# Patient Record
Sex: FEMALE | Race: WHITE | NOT HISPANIC OR LATINO | URBAN - METROPOLITAN AREA
[De-identification: names, ages, dates, MRNs, and addresses within clinical notes are randomized per-mention and may not be internally consistent; named-entity substitution may affect disease eponyms.]

---

## 2017-05-22 ENCOUNTER — IMPORTED ENCOUNTER (OUTPATIENT)
Dept: URBAN - METROPOLITAN AREA CLINIC 31 | Facility: CLINIC | Age: 47
End: 2017-05-22

## 2017-05-22 PROCEDURE — V2520 CONTACT LENS HYDROPHILIC: HCPCS

## 2017-05-22 PROCEDURE — 92014 COMPRE OPH EXAM EST PT 1/>: CPT

## 2017-05-22 PROCEDURE — 92310 CONTACT LENS FITTING OU: CPT

## 2017-05-22 PROCEDURE — 92015 DETERMINE REFRACTIVE STATE: CPT

## 2018-05-29 ENCOUNTER — IMPORTED ENCOUNTER (OUTPATIENT)
Dept: URBAN - METROPOLITAN AREA CLINIC 31 | Facility: CLINIC | Age: 48
End: 2018-05-29

## 2018-05-29 PROCEDURE — 92310 CONTACT LENS FITTING OU: CPT

## 2018-05-29 PROCEDURE — 92014 COMPRE OPH EXAM EST PT 1/>: CPT

## 2018-05-29 PROCEDURE — 92015 DETERMINE REFRACTIVE STATE: CPT

## 2018-05-29 PROCEDURE — V2520 CONTACT LENS HYDROPHILIC: HCPCS

## 2019-08-12 ENCOUNTER — IMPORTED ENCOUNTER (OUTPATIENT)
Dept: URBAN - METROPOLITAN AREA CLINIC 31 | Facility: CLINIC | Age: 49
End: 2019-08-12

## 2019-08-12 PROCEDURE — V2520 CONTACT LENS HYDROPHILIC: HCPCS

## 2019-08-12 PROCEDURE — 92014 COMPRE OPH EXAM EST PT 1/>: CPT

## 2019-08-12 PROCEDURE — 92015 DETERMINE REFRACTIVE STATE: CPT

## 2019-08-12 PROCEDURE — 92310 CONTACT LENS FITTING OU: CPT

## 2021-09-21 ENCOUNTER — IMPORTED ENCOUNTER (OUTPATIENT)
Dept: URBAN - METROPOLITAN AREA CLINIC 31 | Facility: CLINIC | Age: 51
End: 2021-09-21

## 2021-09-21 PROCEDURE — 92310 CONTACT LENS FITTING OU: CPT

## 2021-09-21 PROCEDURE — 92015 DETERMINE REFRACTIVE STATE: CPT

## 2021-09-21 PROCEDURE — 92014 COMPRE OPH EXAM EST PT 1/>: CPT

## 2021-09-21 NOTE — PATIENT DISCUSSION
Change glasses. Dispense trial contacts OU. To discontinue and call if any problems. Change to improve reading OS a quarter.

## 2022-04-02 ASSESSMENT — TONOMETRY
OD_IOP_MMHG: 12
OD_IOP_MMHG: 15
OS_IOP_MMHG: 12
OS_IOP_MMHG: 12
OS_IOP_MMHG: 14
OS_IOP_MMHG: 15
OD_IOP_MMHG: 14
OD_IOP_MMHG: 12

## 2022-04-02 ASSESSMENT — KERATOMETRY
OS_AXISANGLE_DEGREES: 008
OS_K2POWER_DIOPTERS: 44.75
OD_AXISANGLE2_DEGREES: 095
OS_K2POWER_DIOPTERS: 45.00
OD_AXISANGLE2_DEGREES: 089
OD_K2POWER_DIOPTERS: 45.00
OD_K1POWER_DIOPTERS: 43.75
OS_AXISANGLE2_DEGREES: 100
OS_K1POWER_DIOPTERS: 43.75
OS_AXISANGLE2_DEGREES: 098
OD_K2POWER_DIOPTERS: 44.00
OS_K1POWER_DIOPTERS: 43.50
OD_K1POWER_DIOPTERS: 43.50
OD_AXISANGLE_DEGREES: 179
OS_AXISANGLE_DEGREES: 010
OD_AXISANGLE_DEGREES: 005

## 2022-04-02 ASSESSMENT — VISUAL ACUITY
OD_SC: 20/40-2
OD_GLARE: 20/20
OD_SC: 20/20
OS_SC: 20/30
OS_CC: J1+
OS_CC: J216"
OS_CC: J7
OD_SC: 20/20-2
OD_SC: 20/25
OS_SC: 20/25
OD_CC: J1+16"
OS_CC: J1+-2
OD_SC: 20/20

## 2022-07-09 ENCOUNTER — TELEPHONE ENCOUNTER (OUTPATIENT)
Dept: URBAN - METROPOLITAN AREA CLINIC 121 | Facility: CLINIC | Age: 52
End: 2022-07-09

## 2022-07-10 ENCOUNTER — TELEPHONE ENCOUNTER (OUTPATIENT)
Dept: URBAN - METROPOLITAN AREA CLINIC 121 | Facility: CLINIC | Age: 52
End: 2022-07-10

## 2022-07-10 RX ORDER — DIPHENHYDRAMINE HCL 2 %
CREAM (GRAM) TOPICAL
Refills: 0 | Status: ACTIVE | COMMUNITY
Start: 2020-01-30

## 2022-07-10 RX ORDER — ONDANSETRON HYDROCHLORIDE 4 MG/1
USE AS DIRECTED TAKE 1 TABLET 30 MINUTES PRIOR TO EACH HALF OF COLONOSCOPY PREP TO PREVENT NAUSEA TABLET, FILM COATED ORAL
Refills: 0 | Status: ACTIVE | COMMUNITY
Start: 2020-01-30

## 2022-07-10 RX ORDER — NORTRIPTYLINE HYDROCHLORIDE 75 MG/1
CAPSULE ORAL
Refills: 0 | Status: ACTIVE | COMMUNITY
Start: 2020-01-30

## 2022-07-10 RX ORDER — MELATONIN 3 MG
CAPSULE ORAL
Refills: 0 | Status: ACTIVE | COMMUNITY
Start: 2020-01-30